# Patient Record
Sex: FEMALE
[De-identification: names, ages, dates, MRNs, and addresses within clinical notes are randomized per-mention and may not be internally consistent; named-entity substitution may affect disease eponyms.]

---

## 2020-06-17 VITALS
DIASTOLIC BLOOD PRESSURE: 77 MMHG | HEART RATE: 75 BPM | TEMPERATURE: 97 F | RESPIRATION RATE: 18 BRPM | SYSTOLIC BLOOD PRESSURE: 144 MMHG

## 2020-06-17 NOTE — NUR
Aspirin



Informed Dr. Sony Burgos's assistant that pt has been holding ASA 325mg since 6/15/2020. 
 As per Dr. Cardoza, pt may continue to hold ASA.

## 2020-06-17 NOTE — NUR
RE: ABNORMAL LABS



INFORMED RYAN ACOSTA REGARDING ABNORMAL LABS (HGB 11.3, HCT 37.2, ) . NO NEW 
ORDERS, MAY PROCEED WITH PROCEDURE.

## 2020-06-18 ENCOUNTER — HOSPITAL ENCOUNTER (OUTPATIENT)
Dept: HOSPITAL 90 - DAH | Age: 45
Setting detail: OBSERVATION
LOS: 1 days | Discharge: HOME | End: 2020-06-19
Attending: INTERNAL MEDICINE | Admitting: INTERNAL MEDICINE
Payer: COMMERCIAL

## 2020-06-18 VITALS — HEART RATE: 85 BPM | DIASTOLIC BLOOD PRESSURE: 65 MMHG | SYSTOLIC BLOOD PRESSURE: 130 MMHG | RESPIRATION RATE: 18 BRPM

## 2020-06-18 VITALS — RESPIRATION RATE: 18 BRPM | HEART RATE: 88 BPM | SYSTOLIC BLOOD PRESSURE: 100 MMHG | DIASTOLIC BLOOD PRESSURE: 44 MMHG

## 2020-06-18 VITALS
HEART RATE: 90 BPM | SYSTOLIC BLOOD PRESSURE: 128 MMHG | TEMPERATURE: 97.1 F | DIASTOLIC BLOOD PRESSURE: 72 MMHG | RESPIRATION RATE: 16 BRPM

## 2020-06-18 VITALS — BODY MASS INDEX: 36.64 KG/M2 | HEIGHT: 65 IN | WEIGHT: 219.9 LBS

## 2020-06-18 VITALS
RESPIRATION RATE: 18 BRPM | SYSTOLIC BLOOD PRESSURE: 107 MMHG | DIASTOLIC BLOOD PRESSURE: 63 MMHG | HEART RATE: 89 BPM | TEMPERATURE: 98.3 F

## 2020-06-18 VITALS
DIASTOLIC BLOOD PRESSURE: 64 MMHG | RESPIRATION RATE: 18 BRPM | TEMPERATURE: 98.1 F | HEART RATE: 85 BPM | SYSTOLIC BLOOD PRESSURE: 115 MMHG

## 2020-06-18 VITALS — SYSTOLIC BLOOD PRESSURE: 98 MMHG | DIASTOLIC BLOOD PRESSURE: 66 MMHG | HEART RATE: 71 BPM | RESPIRATION RATE: 18 BRPM

## 2020-06-18 VITALS — RESPIRATION RATE: 18 BRPM | HEART RATE: 81 BPM | DIASTOLIC BLOOD PRESSURE: 69 MMHG | SYSTOLIC BLOOD PRESSURE: 109 MMHG

## 2020-06-18 VITALS — DIASTOLIC BLOOD PRESSURE: 63 MMHG | RESPIRATION RATE: 18 BRPM | SYSTOLIC BLOOD PRESSURE: 97 MMHG | HEART RATE: 59 BPM

## 2020-06-18 DIAGNOSIS — Z79.84: ICD-10-CM

## 2020-06-18 DIAGNOSIS — Z79.899: ICD-10-CM

## 2020-06-18 DIAGNOSIS — Z98.890: ICD-10-CM

## 2020-06-18 DIAGNOSIS — Z85.09: ICD-10-CM

## 2020-06-18 DIAGNOSIS — E66.01: ICD-10-CM

## 2020-06-18 DIAGNOSIS — Z90.710: ICD-10-CM

## 2020-06-18 DIAGNOSIS — I47.1: Primary | ICD-10-CM

## 2020-06-18 DIAGNOSIS — E11.9: ICD-10-CM

## 2020-06-18 DIAGNOSIS — F17.210: ICD-10-CM

## 2020-06-18 PROCEDURE — 85610 PROTHROMBIN TIME: CPT

## 2020-06-18 PROCEDURE — 85730 THROMBOPLASTIN TIME PARTIAL: CPT

## 2020-06-18 PROCEDURE — 93613 INTRACARDIAC EPHYS 3D MAPG: CPT

## 2020-06-18 PROCEDURE — 93621 COMP EP EVL L PAC&REC C SINS: CPT

## 2020-06-18 PROCEDURE — 82948 REAGENT STRIP/BLOOD GLUCOSE: CPT

## 2020-06-18 PROCEDURE — 93005 ELECTROCARDIOGRAM TRACING: CPT

## 2020-06-18 PROCEDURE — 93623 PRGRMD STIMJ&PACG IV RX NFS: CPT

## 2020-06-18 PROCEDURE — 80048 BASIC METABOLIC PNL TOTAL CA: CPT

## 2020-06-18 PROCEDURE — 36415 COLL VENOUS BLD VENIPUNCTURE: CPT

## 2020-06-18 PROCEDURE — 85025 COMPLETE CBC W/AUTO DIFF WBC: CPT

## 2020-06-18 PROCEDURE — 93653 COMPRE EP EVAL TX SVT: CPT

## 2020-06-19 VITALS
RESPIRATION RATE: 18 BRPM | HEART RATE: 84 BPM | DIASTOLIC BLOOD PRESSURE: 72 MMHG | SYSTOLIC BLOOD PRESSURE: 109 MMHG | TEMPERATURE: 98.7 F

## 2020-06-19 VITALS
HEART RATE: 83 BPM | TEMPERATURE: 98.3 F | DIASTOLIC BLOOD PRESSURE: 63 MMHG | SYSTOLIC BLOOD PRESSURE: 99 MMHG | RESPIRATION RATE: 18 BRPM

## 2020-06-19 VITALS
DIASTOLIC BLOOD PRESSURE: 62 MMHG | TEMPERATURE: 98.2 F | SYSTOLIC BLOOD PRESSURE: 108 MMHG | HEART RATE: 83 BPM | RESPIRATION RATE: 18 BRPM

## 2020-06-19 NOTE — NUR
KEVIN NOTE/IA 

PATIENT DISCHARGED HOME ROOM VISIT. AS PER NURSE, NO NEEDS VERBALIZED.

-------------------------------------------------------------------------------

Addendum: 06/19/20 at 1534 by NYA VEGAS RN CM

-------------------------------------------------------------------------------

Amended: Links added.

## 2020-06-19 NOTE — NUR
d/c instructions complete,  pt stated understanding of all instructions including activity 
level, no heavy lifting for a few weeks, change in home medications, to f/u with dr garcia 
and keep the appointment made;  pt instructed to remove the gauze dressings on her groins 
tomorrow;  iv access and telemetry removed.

## 2020-10-29 ENCOUNTER — HOSPITAL ENCOUNTER (OUTPATIENT)
Dept: HOSPITAL 90 - SHCH | Age: 45
Discharge: HOME | End: 2020-10-29
Attending: INTERNAL MEDICINE
Payer: COMMERCIAL

## 2020-10-29 DIAGNOSIS — R42: Primary | ICD-10-CM

## 2020-10-29 PROCEDURE — 93880 EXTRACRANIAL BILAT STUDY: CPT

## 2023-08-15 ENCOUNTER — HOSPITAL ENCOUNTER (OUTPATIENT)
Dept: HOSPITAL 90 - RAH | Age: 48
Discharge: HOME | End: 2023-08-15
Attending: INTERNAL MEDICINE
Payer: COMMERCIAL

## 2023-08-15 DIAGNOSIS — Z13.6: Primary | ICD-10-CM

## 2023-08-15 PROCEDURE — 75571 CT HRT W/O DYE W/CA TEST: CPT
